# Patient Record
Sex: FEMALE | Race: WHITE | NOT HISPANIC OR LATINO | Employment: FULL TIME | URBAN - METROPOLITAN AREA
[De-identification: names, ages, dates, MRNs, and addresses within clinical notes are randomized per-mention and may not be internally consistent; named-entity substitution may affect disease eponyms.]

---

## 2018-04-24 ENCOUNTER — OFFICE VISIT (OUTPATIENT)
Dept: URGENT CARE | Age: 38
End: 2018-04-24
Payer: COMMERCIAL

## 2018-04-24 VITALS
DIASTOLIC BLOOD PRESSURE: 74 MMHG | BODY MASS INDEX: 32.82 KG/M2 | RESPIRATION RATE: 18 BRPM | HEIGHT: 65 IN | TEMPERATURE: 97.9 F | SYSTOLIC BLOOD PRESSURE: 131 MMHG | OXYGEN SATURATION: 98 % | WEIGHT: 197 LBS

## 2018-04-24 DIAGNOSIS — J30.1 ALLERGIC RHINITIS DUE TO POLLEN, UNSPECIFIED SEASONALITY: Primary | ICD-10-CM

## 2018-04-24 DIAGNOSIS — J02.9 SORE THROAT: ICD-10-CM

## 2018-04-24 LAB — S PYO AG THROAT QL: NEGATIVE

## 2018-04-24 PROCEDURE — 99213 OFFICE O/P EST LOW 20 MIN: CPT | Performed by: FAMILY MEDICINE

## 2018-04-24 RX ORDER — LEVONORGESTREL AND ETHINYL ESTRADIOL 0.1-0.02MG
1 KIT ORAL DAILY
COMMUNITY

## 2018-04-24 NOTE — PROGRESS NOTES
[facility]        NAME: Natasha Downing is a 42 y o  female  : 1980    MRN: [id number]  DATE: 2018  TIME: 8:22 AM    Assessment and Plan   Allergic rhinitis due to pollen, unspecified seasonality [J30 1]  1  Allergic rhinitis due to pollen, unspecified seasonality     2  Sore throat  POCT rapid strepA         Patient Instructions   Add a nasal steroid to your allergy regimen  Continue taking allegra or switch to zyrtec daily  Recheck with PCP if no improvement      Chief Complaint     Chief Complaint   Patient presents with    Sore Throat         History of Present Illness       Sore throat, nasal congestion and post nasal drip for 1 week  Has seasonal allergies  Using allegra x 15 years  Review of Systems   Review of Systems   Constitutional: Negative for chills and fever  HENT: Positive for congestion, postnasal drip, sneezing and sore throat  Negative for ear pain, sinus pain and sinus pressure  Respiratory: Negative for cough  All other systems reviewed and are negative  Current Medications       Current Outpatient Prescriptions:     fexofenadine (ALLEGRA) 30 MG tablet, Take 30 mg by mouth 2 (two) times a day, Disp: , Rfl:     levonorgestrel-ethinyl estradiol (AVIANE,ALESSE,LESSINA) 0 1-20 MG-MCG per tablet, Take 1 tablet by mouth daily, Disp: , Rfl:     Current Allergies     Allergies as of 2018    (No Known Allergies)            The following portions of the patient's history were reviewed and updated as appropriate: allergies, current medications, past family history, past medical history, past social history, past surgical history and problem list    History reviewed  No pertinent past medical history    Past Surgical History:   Procedure Laterality Date    TONSILLECTOMY             Objective   /74   Temp 97 9 °F (36 6 °C) (Temporal)   Resp 18   Ht 5' 5" (1 651 m)   Wt 89 4 kg (197 lb)   LMP 2018   SpO2 98%   BMI 32 78 kg/m² Physical Exam     Physical Exam   Constitutional: She is oriented to person, place, and time  She appears well-developed and well-nourished  HENT:   Head: Normocephalic and atraumatic  Right Ear: External ear normal    Left Ear: External ear normal    Nose: Mucosal edema present  Mouth/Throat: Oropharynx is clear and moist  No oropharyngeal exudate, posterior oropharyngeal edema or posterior oropharyngeal erythema  Neck: Neck supple  Cardiovascular: Normal rate, regular rhythm and normal heart sounds  Pulmonary/Chest: Effort normal and breath sounds normal    Lymphadenopathy:     She has no cervical adenopathy  Neurological: She is alert and oriented to person, place, and time  Psychiatric: She has a normal mood and affect  Her behavior is normal    Nursing note and vitals reviewed  Rapid strep negative

## 2018-04-24 NOTE — PATIENT INSTRUCTIONS
Add a nasal steroid to your allergy regimen  Continue taking allegra or switch to zyrtec daily  Recheck with PCP if no improvement

## 2021-04-08 DIAGNOSIS — Z23 ENCOUNTER FOR IMMUNIZATION: ICD-10-CM
